# Patient Record
Sex: FEMALE | Race: WHITE | Employment: OTHER | URBAN - METROPOLITAN AREA
[De-identification: names, ages, dates, MRNs, and addresses within clinical notes are randomized per-mention and may not be internally consistent; named-entity substitution may affect disease eponyms.]

---

## 2017-05-02 ENCOUNTER — HOSPITAL ENCOUNTER (EMERGENCY)
Age: 82
Discharge: HOME | End: 2017-05-02
Attending: STUDENT IN AN ORGANIZED HEALTH CARE EDUCATION/TRAINING PROGRAM
Payer: MEDICARE

## 2017-05-02 VITALS
OXYGEN SATURATION: 96 % | SYSTOLIC BLOOD PRESSURE: 138 MMHG | HEART RATE: 79 BPM | DIASTOLIC BLOOD PRESSURE: 71 MMHG | RESPIRATION RATE: 16 BRPM | HEIGHT: 66 IN | TEMPERATURE: 98.3 F

## 2017-05-02 DIAGNOSIS — L98.9 SKIN LESION OF FOOT: Primary | ICD-10-CM

## 2017-05-02 PROCEDURE — 99282 EMERGENCY DEPT VISIT SF MDM: CPT

## 2017-05-02 RX ORDER — KETOCONAZOLE 20 MG/G
CREAM TOPICAL DAILY
Qty: 30 G | Refills: 1 | Status: SHIPPED | OUTPATIENT
Start: 2017-05-02

## 2017-05-02 NOTE — DISCHARGE INSTRUCTIONS
We hope that we have addressed all of your medical concerns. The examination and treatment you received in the Emergency Department were for an emergent problem and were not intended as complete care. It is important that you follow up with your healthcare provider(s) for ongoing care. If your symptoms worsen or do not improve as expected, and you are unable to reach your usual health care provider(s), you should return to the Emergency Department. Today's healthcare is undergoing tremendous change, and patient satisfaction surveys are one of the many tools to assess the quality of medical care. You may receive a survey from the [employer] regarding your experience in the Emergency Department. I hope that your experience has been completely positive, particularly the medical care that I provided. As such, please participate in the survey; anything less than excellent does not meet my expectations or intentions. [facility] and [address] [place] [address] participate in nationally recognized quality of care measures. If your blood pressure is greater than 120/80, as reported below, we urge that you seek medical care to address the potential of high blood pressure, commonly known as hypertension. Hypertension can be hereditary or can be caused by certain medical conditions, pain, stress, or \"white coat syndrome. \"       Please make an appointment with your health care provider(s) for follow up of your Emergency Department visit. VITALS:   Patient Vitals for the past 8 hrs:   Temp Pulse Resp BP SpO2   05/02/17 1240 98.3 °F (36.8 °C) 79 16 138/71 96 %          Thank you for allowing us to provide you with medical care today. We realize that you have many choices for your emergency care needs. Please choose us in the future for any continued health care needs. Lucy Whitten, [address] [place] [address] [address]   Office: [phone]            No results found for this or any previous visit (from the past 24 hour(s)). No results found.

## 2017-05-02 NOTE — ED PROVIDER NOTES
HPI Comments: 80 y.o. female with no significant past medical history who presents from home with chief complaint of left foot redness and pain. Pt reports that for the past week she has had a red area to the plantar surface of her foot with associated burning/ithing pain. She can not recall any recent insect bites or trauma to her foot. She also reports associated nausea that was at its worse this morning. She has tried Neosporin, Lotrimin cream, and nausea medication at home without relief. She denies fever, chills, vomiting, abdominal pain, muscle cramps, growing lesions. There are no other acute medical concerns at this time. PMH: MVR, CHF, HTN, Anxiety  PCP: Say Fabian MD  Note written by felicia Ramos, as dictated by Nury Fitzgerald MD 1:46 PM         The history is provided by the patient. Past Medical History:   Diagnosis Date    Anxiety     Bradycardia     Congestive heart failure (HCC)     Constipated     High blood pressure     Mitral valve regurgitation     Repaired 7/30/09    Psychiatric disorder     Anxiety       Past Surgical History:   Procedure Laterality Date    HX APPENDECTOMY      HX BACK SURGERY      HX BREAST LUMPECTOMY      HX CHOLECYSTECTOMY      HX HYSTERECTOMY      HX PARTIAL THYROIDECTOMY      left side    HX SHOULDER ARTHROSCOPY      Whiplash/muscle surgery    HX TUBAL LIGATION           History reviewed. No pertinent family history. Social History     Social History    Marital status:      Spouse name: N/A    Number of children: N/A    Years of education: N/A     Occupational History    Not on file. Social History Main Topics    Smoking status: Never Smoker    Smokeless tobacco: Not on file    Alcohol use No    Drug use: No    Sexual activity: Not on file     Other Topics Concern    Not on file     Social History Narrative         ALLERGIES: Coreg [carvedilol]; Phenylbutazone; Procainamide; Quinaglute dura-tabs;  Ace inhibitors; Actonel [risedronate]; Aspirin; Atacand [candesartan]; Avelox [moxifloxacin]; Bromplex dm [brompheniramine-pseudoeph-dm]; Calcium channel blocking agents-dihydropyridines; Celexa [citalopram]; Codeine; Corgard [nadolol]; Coumadin [warfarin]; Doxepin; Effexor [venlafaxine]; Hydralazine; Hydroxyzine; Lopressor [metoprolol tartrate]; Macrodantin [nitrofurantoin macrocrystalline]; Motrin [ibuprofen]; Pacerone [amiodarone]; Palgic [carbinoxamine maleate]; Paxil [paroxetine hcl]; Pcn [penicillins]; Phenergan [promethazine]; Prednisone; Propulsid; Serax [oxazepam]; Statins-hmg-coa reductase inhibitors; Sulfa (sulfonamide antibiotics); Tessalon [benzonatate]; Tiazac [diltiazem hcl]; Trazodone; Ultram [tramadol]; Verapamil; Aleda Dais; Zelnorm [tegaserod hydrogen maleate]; and Zetia [ezetimibe]    Review of Systems   Constitutional: Negative for chills and fever. HENT: Negative for congestion, rhinorrhea and sinus pressure. Respiratory: Negative for cough and shortness of breath. Cardiovascular: Negative for chest pain and palpitations. Gastrointestinal: Positive for nausea. Negative for abdominal pain, diarrhea and vomiting. Musculoskeletal: Positive for myalgias. Negative for back pain. Skin: Positive for color change and rash. All other systems reviewed and are negative. Vitals:    05/02/17 1240   BP: 138/71   Pulse: 79   Resp: 16   Temp: 98.3 °F (36.8 °C)   SpO2: 96%   Height: 5' 6\" (1.676 m)            Physical Exam   Constitutional: She is oriented to person, place, and time. She appears well-developed and well-nourished. HENT:   Head: Normocephalic and atraumatic. Eyes: Conjunctivae and EOM are normal. Pupils are equal, round, and reactive to light. Neck: Normal range of motion. Neck supple. Cardiovascular: Normal rate, regular rhythm and normal heart sounds. No murmur heard. Pulmonary/Chest: Effort normal and breath sounds normal. No respiratory distress. Abdominal: Soft. Bowel sounds are normal. She exhibits no distension. There is no tenderness. There is no rebound. Musculoskeletal: Normal range of motion. She exhibits no edema. Neurological: She is alert and oriented to person, place, and time. No cranial nerve deficit. She exhibits normal muscle tone. Coordination normal.   Skin: Skin is warm and dry. There is erythema. 2X3 cm area of erythema to plantar surface of left foot (see image)   Psychiatric: She has a normal mood and affect. Her behavior is normal.   Nursing note and vitals reviewed. Note written by felicia Bess, as dictated by No att. providers found 2:04 PM      MDM  ED Course   DDx: favor fungal infection given ringed, [place]-shape with prurutis. Less likely insect bite, infection, inflammation (no h/o gout).     Procedures

## 2017-05-02 NOTE — ED TRIAGE NOTES
Pt stated there is a red spot on the bottom of her left foot for a week, denies injury, denies fever , pt stated it itches , +red area noted to bottom of foot, no swelling noted

## 2017-05-05 NOTE — SENIOR SERVICES NOTE
Call placed to patient - introduced to role of CM. Patient has been applying ointment to her foot. No needs verbalized. Patient asked if notification will be sent to PCP - informed would receive documentation for ED visit.

## 2017-05-27 ENCOUNTER — APPOINTMENT (OUTPATIENT)
Dept: GENERAL RADIOLOGY | Age: 82
End: 2017-05-27
Attending: NURSE PRACTITIONER
Payer: MEDICARE

## 2017-05-27 ENCOUNTER — HOSPITAL ENCOUNTER (EMERGENCY)
Age: 82
Discharge: HOME | End: 2017-05-28
Attending: EMERGENCY MEDICINE
Payer: MEDICARE

## 2017-05-27 DIAGNOSIS — K57.90 DIVERTICULOSIS OF INTESTINE WITHOUT BLEEDING, UNSPECIFIED INTESTINAL TRACT LOCATION: ICD-10-CM

## 2017-05-27 DIAGNOSIS — R63.0 LACK OF APPETITE: ICD-10-CM

## 2017-05-27 DIAGNOSIS — N28.89 LEFT RENAL MASS: ICD-10-CM

## 2017-05-27 DIAGNOSIS — K59.00 CONSTIPATION, UNSPECIFIED CONSTIPATION TYPE: Primary | ICD-10-CM

## 2017-05-27 DIAGNOSIS — Z87.19 HISTORY OF IBS: ICD-10-CM

## 2017-05-27 DIAGNOSIS — Z86.59 HISTORY OF ANXIETY: ICD-10-CM

## 2017-05-27 LAB
BASOPHILS # BLD AUTO: 0 K/UL (ref 0–0.1)
BASOPHILS # BLD: 0 % (ref 0–1)
EOSINOPHIL # BLD: 0.2 K/UL (ref 0–0.4)
EOSINOPHIL NFR BLD: 2 % (ref 0–7)
ERYTHROCYTE [DISTWIDTH] IN BLOOD BY AUTOMATED COUNT: 13.6 % (ref 11.5–14.5)
HCT VFR BLD AUTO: 39.3 % (ref 35–47)
HGB BLD-MCNC: 12.6 G/DL (ref 11.5–16)
LYMPHOCYTES # BLD AUTO: 22 % (ref 12–49)
LYMPHOCYTES # BLD: 1.8 K/UL (ref 0.8–3.5)
MCH RBC QN AUTO: 29.3 PG (ref 26–34)
MCHC RBC AUTO-ENTMCNC: 32.1 G/DL (ref 30–36.5)
MCV RBC AUTO: 91.4 FL (ref 80–99)
MONOCYTES # BLD: 0.9 K/UL (ref 0–1)
MONOCYTES NFR BLD AUTO: 11 % (ref 5–13)
NEUTS SEG # BLD: 5.1 K/UL (ref 1.8–8)
NEUTS SEG NFR BLD AUTO: 65 % (ref 32–75)
PLATELET # BLD AUTO: 230 K/UL (ref 150–400)
RBC # BLD AUTO: 4.3 M/UL (ref 3.8–5.2)
WBC # BLD AUTO: 8 K/UL (ref 3.6–11)

## 2017-05-27 PROCEDURE — 74011250636 HC RX REV CODE- 250/636: Performed by: NURSE PRACTITIONER

## 2017-05-27 PROCEDURE — 74020 XR ABD FLAT/ ERECT: CPT

## 2017-05-27 PROCEDURE — 99284 EMERGENCY DEPT VISIT MOD MDM: CPT

## 2017-05-27 PROCEDURE — 80053 COMPREHEN METABOLIC PANEL: CPT | Performed by: NURSE PRACTITIONER

## 2017-05-27 PROCEDURE — 96360 HYDRATION IV INFUSION INIT: CPT

## 2017-05-27 PROCEDURE — 85025 COMPLETE CBC W/AUTO DIFF WBC: CPT | Performed by: NURSE PRACTITIONER

## 2017-05-27 PROCEDURE — 83690 ASSAY OF LIPASE: CPT | Performed by: NURSE PRACTITIONER

## 2017-05-27 PROCEDURE — 36415 COLL VENOUS BLD VENIPUNCTURE: CPT | Performed by: NURSE PRACTITIONER

## 2017-05-27 RX ADMIN — SODIUM CHLORIDE 500 ML: 900 INJECTION, SOLUTION INTRAVENOUS at 23:21

## 2017-05-28 ENCOUNTER — APPOINTMENT (OUTPATIENT)
Dept: CT IMAGING | Age: 82
End: 2017-05-28
Attending: NURSE PRACTITIONER
Payer: MEDICARE

## 2017-05-28 VITALS
DIASTOLIC BLOOD PRESSURE: 78 MMHG | TEMPERATURE: 97.9 F | HEART RATE: 80 BPM | OXYGEN SATURATION: 96 % | SYSTOLIC BLOOD PRESSURE: 147 MMHG | RESPIRATION RATE: 18 BRPM

## 2017-05-28 LAB
ALBUMIN SERPL BCP-MCNC: 3.9 G/DL (ref 3.5–5)
ALBUMIN/GLOB SERPL: 0.9 {RATIO} (ref 1.1–2.2)
ALP SERPL-CCNC: 70 U/L (ref 45–117)
ALT SERPL-CCNC: 23 U/L (ref 12–78)
ANION GAP BLD CALC-SCNC: 8 MMOL/L (ref 5–15)
AST SERPL W P-5'-P-CCNC: 18 U/L (ref 15–37)
BILIRUB SERPL-MCNC: 1.1 MG/DL (ref 0.2–1)
BUN SERPL-MCNC: 15 MG/DL (ref 6–20)
BUN/CREAT SERPL: 12 (ref 12–20)
CALCIUM SERPL-MCNC: 9.2 MG/DL (ref 8.5–10.1)
CHLORIDE SERPL-SCNC: 95 MMOL/L (ref 97–108)
CO2 SERPL-SCNC: 31 MMOL/L (ref 21–32)
CREAT SERPL-MCNC: 1.25 MG/DL (ref 0.55–1.02)
GLOBULIN SER CALC-MCNC: 4.4 G/DL (ref 2–4)
GLUCOSE SERPL-MCNC: 114 MG/DL (ref 65–100)
HEMOCCULT STL QL: NEGATIVE
LIPASE SERPL-CCNC: 174 U/L (ref 73–393)
POTASSIUM SERPL-SCNC: 3.8 MMOL/L (ref 3.5–5.1)
PROT SERPL-MCNC: 8.3 G/DL (ref 6.4–8.2)
SODIUM SERPL-SCNC: 134 MMOL/L (ref 136–145)

## 2017-05-28 PROCEDURE — 82272 OCCULT BLD FECES 1-3 TESTS: CPT | Performed by: NURSE PRACTITIONER

## 2017-05-28 PROCEDURE — 74011636320 HC RX REV CODE- 636/320: Performed by: EMERGENCY MEDICINE

## 2017-05-28 PROCEDURE — 74011250637 HC RX REV CODE- 250/637: Performed by: NURSE PRACTITIONER

## 2017-05-28 PROCEDURE — 74176 CT ABD & PELVIS W/O CONTRAST: CPT

## 2017-05-28 RX ORDER — DICYCLOMINE HYDROCHLORIDE 10 MG/1
10 CAPSULE ORAL 4 TIMES DAILY
Qty: 20 CAP | Refills: 0 | Status: SHIPPED | OUTPATIENT
Start: 2017-05-28 | End: 2017-06-02

## 2017-05-28 RX ORDER — AMOXICILLIN 250 MG
2 CAPSULE ORAL DAILY
Qty: 10 TAB | Refills: 0 | Status: SHIPPED | OUTPATIENT
Start: 2017-05-28 | End: 2017-06-02

## 2017-05-28 RX ORDER — DICYCLOMINE HYDROCHLORIDE 10 MG/ML
INJECTION INTRAMUSCULAR
Status: DISCONTINUED
Start: 2017-05-28 | End: 2017-05-28 | Stop reason: HOSPADM

## 2017-05-28 RX ORDER — DICYCLOMINE HYDROCHLORIDE 10 MG/1
CAPSULE ORAL
Status: DISCONTINUED
Start: 2017-05-28 | End: 2017-05-28 | Stop reason: HOSPADM

## 2017-05-28 RX ORDER — AMOXICILLIN 250 MG
3 CAPSULE ORAL
Status: COMPLETED | OUTPATIENT
Start: 2017-05-28 | End: 2017-05-28

## 2017-05-28 RX ORDER — PEPPERMINT OIL
SPIRIT ORAL ONCE
Status: COMPLETED | OUTPATIENT
Start: 2017-05-28 | End: 2017-05-28

## 2017-05-28 RX ORDER — DICYCLOMINE HYDROCHLORIDE 10 MG/1
10 CAPSULE ORAL
Status: COMPLETED | OUTPATIENT
Start: 2017-05-28 | End: 2017-05-28

## 2017-05-28 RX ADMIN — IOHEXOL 50 ML: 240 INJECTION, SOLUTION INTRATHECAL; INTRAVASCULAR; INTRAVENOUS; ORAL at 02:31

## 2017-05-28 RX ADMIN — DICYCLOMINE HYDROCHLORIDE 10 MG: 10 CAPSULE ORAL at 01:22

## 2017-05-28 RX ADMIN — Medication: at 01:27

## 2017-05-28 RX ADMIN — DOCUSATE SODIUM AND SENNOSIDES 3 TABLET: 8.6; 5 TABLET, FILM COATED ORAL at 01:25

## 2017-05-28 NOTE — ED NOTES
Patient received discharge instructions by PA and nurse. Patient verbalized understanding of medication use and other discharge instructions. Updated vitals, IV DC and patient wheeled out of ED accompanied by family and nurse, showing no signs of distress. Pt reports relief of most intense pain. All questions answered.

## 2017-05-28 NOTE — ED NOTES
Pt resting comfortably on stretcher. Denies needs at this time. Call light within reach, patient instructed on use.     Patient drinking CT contrast.

## 2017-05-28 NOTE — DISCHARGE INSTRUCTIONS
We hope that we have addressed all of your medical concerns. The examination and treatment you received in the Emergency Department were for an emergent problem and were not intended as complete care. It is important that you follow up with your healthcare provider(s) for ongoing care. If your symptoms worsen or do not improve as expected, and you are unable to reach your usual health care provider(s), you should return to the Emergency Department. Today's healthcare is undergoing tremendous change, and patient satisfaction surveys are one of the many tools to assess the quality of medical care. You may receive a survey from the [employer] regarding your experience in the Emergency Department. I hope that your experience has been completely positive, particularly the medical care that I provided. As such, please participate in the survey; anything less than excellent does not meet my expectations or intentions. [facility] and [address] [place] [address] participate in nationally recognized quality of care measures. If your blood pressure is greater than 120/80, as reported below, we urge that you seek medical care to address the potential of high blood pressure, commonly known as hypertension. Hypertension can be hereditary or can be caused by certain medical conditions, pain, stress, or \"white coat syndrome. \"       Please make an appointment with your health care provider(s) for follow up of your Emergency Department visit. VITALS:   Patient Vitals for the past 8 hrs:   Temp Pulse Resp BP SpO2   05/27/17 2300 97.9 °F (36.6 °C) 86 16 145/81 96 %          Thank you for allowing us to provide you with medical care today. We realize that you have many choices for your emergency care needs. Please choose us in the future for any continued health care needs. Cara Landry  [address] [facility],[address].   Office: [phone]            Recent Results (from the past 24 hour(s))   CBC WITH AUTOMATED DIFF    Collection Time: 05/27/17 11:20 PM   Result Value Ref Range    WBC 8.0 3.6 - 11.0 K/uL    RBC 4.30 3.80 - 5.20 M/uL    HGB 12.6 11.5 - 16.0 g/dL    HCT 39.3 35.0 - 47.0 %    MCV 91.4 80.0 - 99.0 FL    MCH 29.3 26.0 - 34.0 PG    MCHC 32.1 30.0 - 36.5 g/dL    RDW 13.6 11.5 - 14.5 %    PLATELET 864 108 - 152 K/uL    NEUTROPHILS 65 32 - 75 %    LYMPHOCYTES 22 12 - 49 %    MONOCYTES 11 5 - 13 %    EOSINOPHILS 2 0 - 7 %    BASOPHILS 0 0 - 1 %    ABS. NEUTROPHILS 5.1 1.8 - 8.0 K/UL    ABS. LYMPHOCYTES 1.8 0.8 - 3.5 K/UL    ABS. MONOCYTES 0.9 0.0 - 1.0 K/UL    ABS. EOSINOPHILS 0.2 0.0 - 0.4 K/UL    ABS. BASOPHILS 0.0 0.0 - 0.1 K/UL   METABOLIC PANEL, COMPREHENSIVE    Collection Time: 05/27/17 11:20 PM   Result Value Ref Range    Sodium 134 (L) 136 - 145 mmol/L    Potassium 3.8 3.5 - 5.1 mmol/L    Chloride 95 (L) 97 - 108 mmol/L    CO2 31 21 - 32 mmol/L    Anion gap 8 5 - 15 mmol/L    Glucose 114 (H) 65 - 100 mg/dL    BUN 15 6 - 20 MG/DL    Creatinine 1.25 (H) 0.55 - 1.02 MG/DL    BUN/Creatinine ratio 12 12 - 20      GFR est AA 49 (L) >60 ml/min/1.73m2    GFR est non-AA 40 (L) >60 ml/min/1.73m2    Calcium 9.2 8.5 - 10.1 MG/DL    Bilirubin, total 1.1 (H) 0.2 - 1.0 MG/DL    ALT (SGPT) 23 12 - 78 U/L    AST (SGOT) 18 15 - 37 U/L    Alk. phosphatase 70 45 - 117 U/L    Protein, total 8.3 (H) 6.4 - 8.2 g/dL    Albumin 3.9 3.5 - 5.0 g/dL    Globulin 4.4 (H) 2.0 - 4.0 g/dL    A-G Ratio 0.9 (L) 1.1 - 2.2         Xr Abd Flat/ Erect    Result Date: 5/27/2017  EXAMINATION: Abdomen 2 views. INDICATION: abdominal pain x3 d w/o BM Supine and upright views of the abdomen show the bowel gas pattern is within normal limits. There is mild stool. There is no pneumoperitoneum. There are vascular calcifications and chronic hepatomegaly. IMPRESSION:  No acute findings.                 Constipation: Care Instructions  Your Care Instructions  Constipation means that you have a hard time passing stools (bowel movements). People pass stools from 3 times a day to once every 3 days. What is normal for you may be different. Constipation may occur with pain in the rectum and cramping. The pain may get worse when you try to pass stools. Sometimes there are small amounts of bright red blood on toilet paper or the surface of stools. This is because of enlarged veins near the rectum (hemorrhoids). A few changes in your diet and lifestyle may help you avoid ongoing constipation. Your doctor may also prescribe medicine to help loosen your stool. Some medicines can cause constipation. These include pain medicines and antidepressants. Tell your doctor about all the medicines you take. Your doctor may want to make a medicine change to ease your symptoms. Follow-up care is a key part of your treatment and safety. Be sure to make and go to all appointments, and call your doctor if you are having problems. It's also a good idea to know your test results and keep a list of the medicines you take. How can you care for yourself at home? · Drink plenty of fluids, enough so that your urine is light yellow or clear like water. If you have kidney, heart, or liver disease and have to limit fluids, talk with your doctor before you increase the amount of fluids you drink. · Include high-fiber foods in your diet each day. These include fruits, vegetables, beans, and whole grains. · Get at least 30 minutes of exercise on most days of the week. Walking is a good choice. You also may want to do other activities, such as running, swimming, cycling, or playing tennis or team sports. · Take a fiber supplement, such as Citrucel or Metamucil, every day. Read and follow all instructions on the label. · Schedule time each day for a bowel movement. A daily routine may help. Take your time having your bowel movement.   · Support your feet with a small step stool when you sit on the toilet. This helps flex your hips and places your pelvis in a squatting position. · Your doctor may recommend an over-the-counter laxative to relieve your constipation. Examples are Milk of Magnesia and MiraLax. Read and follow all instructions on the label. Do not use laxatives on a long-term basis. When should you call for help? Call your doctor now or seek immediate medical care if:  · You have new or worse belly pain. · You have new or worse nausea or vomiting. · You have blood in your stools. Watch closely for changes in your health, and be sure to contact your doctor if:  · Your constipation is getting worse. · You do not get better as expected. Where can you learn more? Go to http://kathi-samira.info/. Enter 21 [phone] in the search box to learn more about \"Constipation: Care Instructions. \"  Current as of: May 27, 2016  Content Version: 11.2  © 9365-7899 [employer]. Care instructions adapted under license by [employer] (which disclaims liability or warranty for this information). If you have questions about a medical condition or this instruction, always ask your healthcare professional. [place] [address] any warranty or liability for your use of this information.

## 2017-05-28 NOTE — ED PROVIDER NOTES
HPI Comments: Fletcher Dinero is a 80 y.o. female with Hx of IBS, anxiety, CHF constipation who presents ambulatory with family to [facility] ED with cc of lower abdominal pain and constipation. Pt reports no BM x2-3days. Pt describes the pain as a cramping sensation and feeling as if she needs to have a BM. She reports taking Milk of Magnesia and drinking a laxative tea which normally relieves her constipation. She states that she has drank the tea more often than normal and has not been able to go to the BR. She reports that she feels the urge to have a BM but has been unable to. She states that she has lost her appetite and is nauseated today. She denies any episodes of vomiting. She reports that when she strains to have a BM she occasionally wipes blood on the toilet paper from her rectum. She denies any black tarry stools or BRB stools. No fevers, chills, urinary symptoms. Pt reports having history of a \"twisted colon\" in 1977 and is concerned about her symptoms. She is not under the care of a GI MD. Pt preferred her GI MD who is no longer in practice and has not care for other GI MD that she has seen. Family states pt has not seen a GI MD in \"years. \"  She reports that she has fluctuations in her stool patterns often. PCP: Blayne Viera MD    There are no other complaints, changes or physical findings at this time. The history is provided by the patient and a relative.         Past Medical History:   Diagnosis Date    Anxiety     Bradycardia     Congestive heart failure (HCC)     Constipated     High blood pressure     Mitral valve regurgitation     Repaired 7/30/09    Psychiatric disorder     Anxiety       Past Surgical History:   Procedure Laterality Date    HX APPENDECTOMY      HX BACK SURGERY      HX BREAST LUMPECTOMY      HX CHOLECYSTECTOMY      HX HYSTERECTOMY      HX PARTIAL THYROIDECTOMY      left side    HX SHOULDER ARTHROSCOPY      Whiplash/muscle surgery    HX TUBAL LIGATION History reviewed. No pertinent family history. Social History     Social History    Marital status:      Spouse name: N/A    Number of children: N/A    Years of education: N/A     Occupational History    Not on file. Social History Main Topics    Smoking status: Never Smoker    Smokeless tobacco: Not on file    Alcohol use No    Drug use: No    Sexual activity: Not on file     Other Topics Concern    Not on file     Social History Narrative         ALLERGIES: Coreg [carvedilol]; Phenylbutazone; Procainamide; Quinaglute dura-tabs; Ace inhibitors; Actonel [risedronate]; Aspirin; Atacand [candesartan]; Avelox [moxifloxacin]; Bromplex dm [brompheniramine-pseudoeph-dm]; Calcium channel blocking agents-dihydropyridines; Celexa [citalopram]; Codeine; Corgard [nadolol]; Coumadin [warfarin]; Doxepin; Effexor [venlafaxine]; Hydralazine; Hydroxyzine; Lopressor [metoprolol tartrate]; Macrodantin [nitrofurantoin macrocrystalline]; Motrin [ibuprofen]; Pacerone [amiodarone]; Palgic [carbinoxamine maleate]; Paxil [paroxetine hcl]; Pcn [penicillins]; Phenergan [promethazine]; Prednisone; Propulsid; Serax [oxazepam]; Statins-hmg-coa reductase inhibitors; Sulfa (sulfonamide antibiotics); Tessalon [benzonatate]; Tiazac [diltiazem hcl]; Trazodone; Ultram [tramadol]; Verapamil; Iraida Odor; Zelnorm [tegaserod hydrogen maleate]; and Zetia [ezetimibe]    Review of Systems   Constitutional: Negative for activity change, appetite change, chills and fever. HENT: Negative for congestion, rhinorrhea, sinus pressure, sneezing and sore throat. Eyes: Negative for pain, discharge and visual disturbance. Respiratory: Negative for cough and shortness of breath. Cardiovascular: Negative for chest pain. Gastrointestinal: Positive for abdominal pain, constipation and nausea. Negative for diarrhea and vomiting. Genitourinary: Negative for dysuria, flank pain, frequency and urgency. Musculoskeletal: Negative for arthralgias, back pain, gait problem, joint swelling, myalgias and neck pain. Skin: Negative for color change and rash. Neurological: Negative for dizziness, speech difficulty, weakness, light-headedness, numbness and headaches. Psychiatric/Behavioral: Negative for agitation, behavioral problems and confusion. All other systems reviewed and are negative. Vitals:    05/27/17 2300 05/28/17 0200   BP: 145/81 140/78   Pulse: 86 86   Resp: 16 16   Temp: 97.9 °F (36.6 °C) 98 °F (36.7 °C)   SpO2: 96% 97%            Physical Exam   Constitutional: She is oriented to person, place, and time. She appears well-developed and well-nourished. No distress. HENT:   Head: Normocephalic and atraumatic. Right Ear: External ear normal.   Left Ear: External ear normal.   Nose: Nose normal.   Mouth/Throat: Oropharynx is clear and moist. No oropharyngeal exudate. Eyes: Conjunctivae and EOM are normal. Pupils are equal, round, and reactive to light. Neck: Normal range of motion. Neck supple. Cardiovascular: Normal rate, regular rhythm and intact distal pulses. Murmur heard. Pulmonary/Chest: Effort normal and breath sounds normal.   Abdominal: Soft. Bowel sounds are normal. There is no tenderness. There is no rebound and no guarding. Musculoskeletal: Normal range of motion. Neurological: She is alert and oriented to person, place, and time. Skin: Skin is warm and dry. Psychiatric: She has a normal mood and affect. Her behavior is normal. Judgment and thought content normal.   Nursing note and vitals reviewed.        MDM  Number of Diagnoses or Management Options  Constipation, unspecified constipation type:   Diverticulosis of intestine without bleeding, unspecified intestinal tract location:   History of anxiety:   History of IBS:   Lack of appetite:   Left renal mass:   Diagnosis management comments: DDx: constipation, SBO, IBS, Diverticulosis/ diverticulitis     81 yo F presents with family 2/2 lower abdominal pain and no BM x 2days. States nausea, no vomitting and lack of appetite. No emergent lab findings. CBC WNL. KUB with non-obstructive bowel pattern. (+) BM post enema with (-) hemoccult. CT with diverticulosis, liver congestion with known rCHF, L renal mass. Advised GI f/u. Given instruction on bowel regimen for next few days. F/u with PCP. Pt tolerated PO w/o difficulty in ED. Family states understanding. Amount and/or Complexity of Data Reviewed  Clinical lab tests: ordered and reviewed  Tests in the radiology section of CPT®: ordered and reviewed  Review and summarize past medical records: yes  Discuss the patient with other providers: yes      ED Course       Procedures    LABORATORY TESTS:  Recent Results (from the past 12 hour(s))   CBC WITH AUTOMATED DIFF    Collection Time: 05/27/17 11:20 PM   Result Value Ref Range    WBC 8.0 3.6 - 11.0 K/uL    RBC 4.30 3.80 - 5.20 M/uL    HGB 12.6 11.5 - 16.0 g/dL    HCT 39.3 35.0 - 47.0 %    MCV 91.4 80.0 - 99.0 FL    MCH 29.3 26.0 - 34.0 PG    MCHC 32.1 30.0 - 36.5 g/dL    RDW 13.6 11.5 - 14.5 %    PLATELET 556 658 - 562 K/uL    NEUTROPHILS 65 32 - 75 %    LYMPHOCYTES 22 12 - 49 %    MONOCYTES 11 5 - 13 %    EOSINOPHILS 2 0 - 7 %    BASOPHILS 0 0 - 1 %    ABS. NEUTROPHILS 5.1 1.8 - 8.0 K/UL    ABS. LYMPHOCYTES 1.8 0.8 - 3.5 K/UL    ABS. MONOCYTES 0.9 0.0 - 1.0 K/UL    ABS. EOSINOPHILS 0.2 0.0 - 0.4 K/UL    ABS.  BASOPHILS 0.0 0.0 - 0.1 K/UL   METABOLIC PANEL, COMPREHENSIVE    Collection Time: 05/27/17 11:20 PM   Result Value Ref Range    Sodium 134 (L) 136 - 145 mmol/L    Potassium 3.8 3.5 - 5.1 mmol/L    Chloride 95 (L) 97 - 108 mmol/L    CO2 31 21 - 32 mmol/L    Anion gap 8 5 - 15 mmol/L    Glucose 114 (H) 65 - 100 mg/dL    BUN 15 6 - 20 MG/DL    Creatinine 1.25 (H) 0.55 - 1.02 MG/DL    BUN/Creatinine ratio 12 12 - 20      GFR est AA 49 (L) >60 ml/min/1.73m2    GFR est non-AA 40 (L) >60 ml/min/1.73m2    Calcium 9.2 8.5 - 10.1 MG/DL    Bilirubin, total 1.1 (H) 0.2 - 1.0 MG/DL    ALT (SGPT) 23 12 - 78 U/L    AST (SGOT) 18 15 - 37 U/L    Alk. phosphatase 70 45 - 117 U/L    Protein, total 8.3 (H) 6.4 - 8.2 g/dL    Albumin 3.9 3.5 - 5.0 g/dL    Globulin 4.4 (H) 2.0 - 4.0 g/dL    A-G Ratio 0.9 (L) 1.1 - 2.2     LIPASE    Collection Time: 05/27/17 11:20 PM   Result Value Ref Range    Lipase 174 73 - 393 U/L   OCCULT BLOOD, STOOL    Collection Time: 05/28/17  1:27 AM   Result Value Ref Range    Occult blood, stool NEGATIVE  NEG         IMAGING RESULTS:  XR ABD FLAT/ ERECT   Final Result   EXAMINATION: Abdomen 2 views.     INDICATION: abdominal pain x3 d w/o BM      Supine and upright views of the abdomen show the bowel gas pattern is within  normal limits. There is mild stool. There is no pneumoperitoneum. There are  vascular calcifications and chronic hepatomegaly.     IMPRESSION  IMPRESSION: No acute findings. CT ABD PELV W CONT    (Results Pending)     INDICATION: Abdominal pain      EXAM: CT Abdomen and CT Pelvis without contrast.  CT dose reduction was achieved through use of a standardized protocol tailored  for this examination and automatic exposure control for dose modulation.     FINDINGS:      Bowels are not distended and there is no significant stool. There are colonic  diverticula.     There is a small to moderate quantity of ascites. There is hepatomegaly,  possibly passive congestion from right heart failure since there is prominent  IVC distention.     No urinary tract stones are seen. There is no hydroureteronephrosis. There is  2.5 cm left renal mass unchanged since 2015.     Pancreas, adrenal glands, spleen and aorta show no significant enlargement. Aorta is calcified. No focal inflammation is seen. There is no free air or  substantial adenopathy.     The bladder is empty. There is no apparent pelvic mass.     IMPRESSION  IMPRESSION:  1. Hepatomegaly, possibly due to passive congestion from right heart failure.   2. Ascites. 3. Colonic diverticulosis. 4. Stable left renal mass.             MEDICATIONS GIVEN:  Medications   dicyclomine (BENTYL) 10 mg/mL injection (  Canceled Entry 5/28/17 0137)   sodium chloride 0.9 % bolus infusion 500 mL (0 mL IntraVENous IV Completed 5/28/17 0021)   peppermint spirit solution ( Other Given 5/28/17 0127)   senna-docusate (PERICOLACE) 8.6-50 mg per tablet 3 Tab (3 Tabs Oral Given 5/28/17 0125)   dicyclomine (BENTYL) capsule 10 mg (10 mg Oral Given 5/28/17 0122)   iohexol (OMNIPAQUE) solution 50 mL (50 mL Oral Given 5/28/17 0231)       IMPRESSION:  1. Constipation, unspecified constipation type    2. History of IBS    3. Lack of appetite    4. History of anxiety    5. Diverticulosis of intestine without bleeding, unspecified intestinal tract location    6. Left renal mass        PLAN:  1. Current Discharge Medication List      START taking these medications    Details   senna-docusate (SENNA WITH DOCUSATE SODIUM) 8.6-50 mg per tablet Take 2 Tabs by mouth daily for 5 days. Qty: 10 Tab, Refills: 0      dicyclomine (BENTYL) 10 mg capsule Take 1 Cap by mouth four (4) times daily for 5 days. Qty: 20 Cap, Refills: 0         CONTINUE these medications which have NOT CHANGED    Details   ketoconazole (NIZORAL) 2 % topical cream Apply  to affected area daily. For 4 weeks. Qty: 30 g, Refills: 1      magnesium hydroxide (MILK OF MAGNESIA) 400 mg/5 mL suspension Take 30 mL by mouth nightly. Indications: BOWEL EVACUATION, GASTROESOPHAGEAL REFLUX, HEARTBURN      apixaban (ELIQUIS) 2.5 mg tablet Take 2.5 mg by mouth two (2) times a day. Calcium Citrate-Vitamin D3 500 mg calcium -400 unit chew Take 1 tablet by mouth daily. acebutolol (SECTRAL) 200 mg capsule Take 200 mg by mouth three (3) times daily. aspirin delayed-release 81 mg tablet Take 81 mg by mouth daily. valsartan (DIOVAN) 160 mg tablet Take 160 mg by mouth daily.       doxazosin (CARDURA) 4 mg tablet Take 4 mg by mouth daily.      furosemide (LASIX) 40 mg tablet Take 40 mg by mouth two (2) times a day. linaclotide (LINZESS) 145 mcg cap capsule Take 145 mcg by mouth daily as needed. lorazepam (ATIVAN) 0.5 mg tablet Take 0.5 mg by mouth every twelve (12) hours as needed for Anxiety. esomeprazole (NEXIUM) 40 mg capsule Take 40 mg by mouth daily. potassium chloride SR (KLOR-CON 10) 10 mEq tablet Take 20 mEq by mouth daily. levothyroxine (SYNTHROID) 100 mcg tablet Take 100 mcg by mouth Daily (before breakfast). cetirizine (ZYRTEC) 10 mg tablet Take 10 mg by mouth daily. LACTASE ENZYME PO Take 2 Tabs by mouth as needed. magnesium oxide (MAG-OX) 400 mg tablet Take 400 mg by mouth daily. Cholecalciferol, Vitamin D3, (VITAMIN D3) 1,000 unit cap Take 1 tablet by mouth.      b complex vitamins tablet Take 1 tablet by mouth daily. iron 18 mg tab Take 18 mg by mouth daily. Brand name = Gentlesorb           2. Follow-up Information     Follow up With Details Comments Contact Info    Tello Schaffer MD Schedule an appointment as soon as possible for a visit  [place] [address] [address]      [facility] [address], [facility] DEP Go to As needed, If symptoms worsen [address] [facility]  [phone]    [facility] Schedule an appointment as soon as possible for a visit  [address] [facility] [address] [facility] [address]          3. Return to ED if worse        Discharge Note:    The patient is ready for discharge. The patient's signs, symptoms, diagnosis, and discharge instruction have been discussed and the patient has conveyed their understanding. The patient is to follow up as recommended or return to the ER should their symptoms worsen. Plan has been discussed and the patient is in agreement.     Micaela Aaron NP

## 2017-05-31 ENCOUNTER — HOSPITAL ENCOUNTER (EMERGENCY)
Age: 82
Discharge: HOME | End: 2017-06-01
Attending: EMERGENCY MEDICINE
Payer: MEDICARE

## 2017-05-31 DIAGNOSIS — R10.84 ABDOMINAL PAIN, GENERALIZED: Primary | ICD-10-CM

## 2017-05-31 DIAGNOSIS — K59.00 CONSTIPATION, UNSPECIFIED CONSTIPATION TYPE: ICD-10-CM

## 2017-05-31 PROCEDURE — 99284 EMERGENCY DEPT VISIT MOD MDM: CPT

## 2017-06-01 ENCOUNTER — APPOINTMENT (OUTPATIENT)
Dept: CT IMAGING | Age: 82
End: 2017-06-01
Attending: PHYSICIAN ASSISTANT
Payer: MEDICARE

## 2017-06-01 ENCOUNTER — APPOINTMENT (OUTPATIENT)
Dept: GENERAL RADIOLOGY | Age: 82
End: 2017-06-01
Attending: PHYSICIAN ASSISTANT
Payer: MEDICARE

## 2017-06-01 VITALS
SYSTOLIC BLOOD PRESSURE: 144 MMHG | OXYGEN SATURATION: 99 % | DIASTOLIC BLOOD PRESSURE: 78 MMHG | HEART RATE: 76 BPM | TEMPERATURE: 98.6 F | RESPIRATION RATE: 18 BRPM

## 2017-06-01 LAB
ALBUMIN SERPL BCP-MCNC: 4 G/DL (ref 3.5–5)
ALBUMIN/GLOB SERPL: 1 {RATIO} (ref 1.1–2.2)
ALP SERPL-CCNC: 68 U/L (ref 45–117)
ALT SERPL-CCNC: 21 U/L (ref 12–78)
ANION GAP BLD CALC-SCNC: 10 MMOL/L (ref 5–15)
AST SERPL W P-5'-P-CCNC: 20 U/L (ref 15–37)
BASOPHILS # BLD AUTO: 0 K/UL (ref 0–0.1)
BASOPHILS # BLD: 0 % (ref 0–1)
BILIRUB SERPL-MCNC: 1.1 MG/DL (ref 0.2–1)
BUN SERPL-MCNC: 11 MG/DL (ref 6–20)
BUN/CREAT SERPL: 9 (ref 12–20)
CALCIUM SERPL-MCNC: 9.4 MG/DL (ref 8.5–10.1)
CHLORIDE SERPL-SCNC: 95 MMOL/L (ref 97–108)
CO2 SERPL-SCNC: 28 MMOL/L (ref 21–32)
CREAT SERPL-MCNC: 1.28 MG/DL (ref 0.55–1.02)
EOSINOPHIL # BLD: 0.1 K/UL (ref 0–0.4)
EOSINOPHIL NFR BLD: 1 % (ref 0–7)
ERYTHROCYTE [DISTWIDTH] IN BLOOD BY AUTOMATED COUNT: 13.3 % (ref 11.5–14.5)
GLOBULIN SER CALC-MCNC: 4.2 G/DL (ref 2–4)
GLUCOSE SERPL-MCNC: 114 MG/DL (ref 65–100)
HCT VFR BLD AUTO: 37.7 % (ref 35–47)
HGB BLD-MCNC: 12.5 G/DL (ref 11.5–16)
LYMPHOCYTES # BLD AUTO: 24 % (ref 12–49)
LYMPHOCYTES # BLD: 1.8 K/UL (ref 0.8–3.5)
MCH RBC QN AUTO: 29.6 PG (ref 26–34)
MCHC RBC AUTO-ENTMCNC: 33.2 G/DL (ref 30–36.5)
MCV RBC AUTO: 89.3 FL (ref 80–99)
MONOCYTES # BLD: 0.9 K/UL (ref 0–1)
MONOCYTES NFR BLD AUTO: 12 % (ref 5–13)
NEUTS SEG # BLD: 4.7 K/UL (ref 1.8–8)
NEUTS SEG NFR BLD AUTO: 63 % (ref 32–75)
PLATELET # BLD AUTO: 235 K/UL (ref 150–400)
POTASSIUM SERPL-SCNC: 3.2 MMOL/L (ref 3.5–5.1)
PROT SERPL-MCNC: 8.2 G/DL (ref 6.4–8.2)
RBC # BLD AUTO: 4.22 M/UL (ref 3.8–5.2)
SODIUM SERPL-SCNC: 133 MMOL/L (ref 136–145)
WBC # BLD AUTO: 7.5 K/UL (ref 3.6–11)

## 2017-06-01 PROCEDURE — 80053 COMPREHEN METABOLIC PANEL: CPT | Performed by: EMERGENCY MEDICINE

## 2017-06-01 PROCEDURE — 74176 CT ABD & PELVIS W/O CONTRAST: CPT

## 2017-06-01 PROCEDURE — 36415 COLL VENOUS BLD VENIPUNCTURE: CPT | Performed by: EMERGENCY MEDICINE

## 2017-06-01 PROCEDURE — 74000 XR ABD (KUB): CPT

## 2017-06-01 PROCEDURE — 85025 COMPLETE CBC W/AUTO DIFF WBC: CPT | Performed by: EMERGENCY MEDICINE

## 2017-06-01 NOTE — ED NOTES
MD reviewed discharge instructions and options with patient and patient verbalized understanding. RN reviewed discharge instructions using teachback method. Pt ambulated to exit without difficulty and in no signs of acute distress escorted by daughter, and she  will drive home. No complaints or needs expressed at this time. Patient was counseled on medications prescribed at discharge. Patient to call PCP in the morning for appointment.

## 2017-06-01 NOTE — DISCHARGE INSTRUCTIONS
We hope that we have addressed all of your medical concerns. The examination and treatment you received in the Emergency Department were for an emergent problem and were not intended as complete care. It is important that you follow up with your healthcare provider(s) for ongoing care. If your symptoms worsen or do not improve as expected, and you are unable to reach your usual health care provider(s), you should return to the Emergency Department. Today's healthcare is undergoing tremendous change, and patient satisfaction surveys are one of the many tools to assess the quality of medical care. You may receive a survey from the [employer] organization regarding your experience in the Emergency Department. I hope that your experience has been completely positive, particularly the medical care that I provided. As such, please participate in the survey; anything less than excellent does not meet my expectations or intentions. [facility] and 8 [facility] participate in nationally recognized quality of care measures. If your blood pressure is greater than 120/80, as reported below, we urge that you seek medical care to address the potential of high blood pressure, commonly known as hypertension. Hypertension can be hereditary or can be caused by certain medical conditions, pain, stress, or \"white coat syndrome. \"       Please make an appointment with your health care provider(s) for follow up of your Emergency Department visit. VITALS:   Patient Vitals for the past 8 hrs:   Temp Pulse Resp BP SpO2   06/01/17 0224 98.6 °F (37 °C) 76 18 144/78 99 %   06/01/17 0045 - 78 18 149/79 97 %   06/01/17 0005 98.7 °F (37.1 °C) 84 18 (!) 143/94 99 %          Thank you for allowing us to provide you with medical care today. We realize that you have many choices for your emergency care needs. Please choose us in the future for any continued health care needs. Regards,           April C. [facility], [address] [place] [address].   Office: [phone]            Recent Results (from the past 24 hour(s))   CBC WITH AUTOMATED DIFF    Collection Time: 06/01/17  1:15 AM   Result Value Ref Range    WBC 7.5 3.6 - 11.0 K/uL    RBC 4.22 3.80 - 5.20 M/uL    HGB 12.5 11.5 - 16.0 g/dL    HCT 37.7 35.0 - 47.0 %    MCV 89.3 80.0 - 99.0 FL    MCH 29.6 26.0 - 34.0 PG    MCHC 33.2 30.0 - 36.5 g/dL    RDW 13.3 11.5 - 14.5 %    PLATELET 811 835 - 306 K/uL    NEUTROPHILS 63 32 - 75 %    LYMPHOCYTES 24 12 - 49 %    MONOCYTES 12 5 - 13 %    EOSINOPHILS 1 0 - 7 %    BASOPHILS 0 0 - 1 %    ABS. NEUTROPHILS 4.7 1.8 - 8.0 K/UL    ABS. LYMPHOCYTES 1.8 0.8 - 3.5 K/UL    ABS. MONOCYTES 0.9 0.0 - 1.0 K/UL    ABS. EOSINOPHILS 0.1 0.0 - 0.4 K/UL    ABS. BASOPHILS 0.0 0.0 - 0.1 K/UL   METABOLIC PANEL, COMPREHENSIVE    Collection Time: 06/01/17  1:15 AM   Result Value Ref Range    Sodium 133 (L) 136 - 145 mmol/L    Potassium 3.2 (L) 3.5 - 5.1 mmol/L    Chloride 95 (L) 97 - 108 mmol/L    CO2 28 21 - 32 mmol/L    Anion gap 10 5 - 15 mmol/L    Glucose 114 (H) 65 - 100 mg/dL    BUN 11 6 - 20 MG/DL    Creatinine 1.28 (H) 0.55 - 1.02 MG/DL    BUN/Creatinine ratio 9 (L) 12 - 20      GFR est AA 48 (L) >60 ml/min/1.73m2    GFR est non-AA 39 (L) >60 ml/min/1.73m2    Calcium 9.4 8.5 - 10.1 MG/DL    Bilirubin, total 1.1 (H) 0.2 - 1.0 MG/DL    ALT (SGPT) 21 12 - 78 U/L    AST (SGOT) 20 15 - 37 U/L    Alk. phosphatase 68 45 - 117 U/L    Protein, total 8.2 6.4 - 8.2 g/dL    Albumin 4.0 3.5 - 5.0 g/dL    Globulin 4.2 (H) 2.0 - 4.0 g/dL    A-G Ratio 1.0 (L) 1.1 - 2.2         Xr Abd (kub)    Result Date: 6/1/2017  EXAM:  XR ABD (KUB) INDICATION:  Abdominal Pain, ? constipation COMPARISON: 5/27/2017. FINDINGS: A supine radiograph of the abdomen shows a nonobstructive bowel gas pattern. A mild amount of stool and gas is seen throughout the colon. . No soft tissue masses or pathologic calcifications are identified. The bones are osteopenic. There is mild leftward curvature of the lumbar spine. . The patient is status post cholecystectomy. IMPRESSION: Mild amount of stool and gas throughout the colon. Ct Abd Pelv Wo Cont    Result Date: 6/1/2017  INDICATION: abdominal distension, constipation, ? diverticulitis vs bowel obstruction COMPARISON: 5/28/2017 TECHNIQUE: Thin axial images were obtained through the abdomen and pelvis. Coronal and sagittal reconstructions were generated. Oral contrast was not administered. CT dose reduction was achieved through use of a standardized protocol tailored for this examination and automatic exposure control for dose modulation. The absence of intravenous contrast material reduces the sensitivity for evaluation of the solid parenchymal organs of the abdomen. FINDINGS: LUNG BASES: There are trace bilateral pleural effusions. INCIDENTALLY IMAGED HEART AND MEDIASTINUM: The heart is moderately enlarged. A pacemaker is incompletely seen. LIVER: There is trace perihepatic ascites. The liver otherwise appears unremarkable. GALLBLADDER: Status post cholecystectomy. SPLEEN: No mass. PANCREAS: No mass or ductal dilatation. ADRENALS: Unremarkable. KIDNEYS/URETERS: There is a 2.4 cm indeterminate lesion in the lateral left kidney. A 9 mm cyst is seen above this. The kidneys show renal cortical thinning, but are otherwise unremarkable. No hydronephrosis. No nephrolithiasis. STOMACH: The stomach is decompressed. SMALL BOWEL: No dilatation or wall thickening. COLON: Multiple diverticula are present. No evidence of diverticulitis. No dilatation or wall thickening. APPENDIX: Unremarkable. PERITONEUM: There is mild ascites in the pelvis. RETROPERITONEUM: No lymphadenopathy or aortic aneurysm. REPRODUCTIVE ORGANS: The uterus is surgically absent. URINARY BLADDER: Decompressed. BONES: The bones are osteopenic. No evidence of a destructive osseous lesion.  ADDITIONAL COMMENTS: N/A     IMPRESSION: 1. Unchanged moderate cardiomegaly with trace bilateral pleural effusions. 2. Unchanged mild perihepatic ascites. Unchanged mild ascites in the deep pelvis. 3. Unchanged 2.4 cm lesion in the lateral left kidney suspicious for renal cell carcinoma. 4. Diverticulosis coli. 5. Status post cholecystectomy and hysterectomy. Abdominal Pain: Care Instructions  Your Care Instructions    Abdominal pain has many possible causes. Some aren't serious and get better on their own in a few days. Others need more testing and treatment. If your pain continues or gets worse, you need to be rechecked and may need more tests to find out what is wrong. You may need surgery to correct the problem. Don't ignore new symptoms, such as fever, nausea and vomiting, urination problems, pain that gets worse, and dizziness. These may be signs of a more serious problem. Your doctor may have recommended a follow-up visit in the next 8 to 12 hours. If you are not getting better, you may need more tests or treatment. The doctor has checked you carefully, but problems can develop later. If you notice any problems or new symptoms, get medical treatment right away. Follow-up care is a key part of your treatment and safety. Be sure to make and go to all appointments, and call your doctor if you are having problems. It's also a good idea to know your test results and keep a list of the medicines you take. How can you care for yourself at home? · Rest until you feel better. · To prevent dehydration, drink plenty of fluids, enough so that your urine is light yellow or clear like water. Choose water and other caffeine-free clear liquids until you feel better. If you have kidney, heart, or liver disease and have to limit fluids, talk with your doctor before you increase the amount of fluids you drink. · If your stomach is upset, eat mild foods, such as rice, dry toast or crackers, bananas, and applesauce.  Try eating several small meals instead of two or three large ones. · Wait until 48 hours after all symptoms have gone away before you have spicy foods, alcohol, and drinks that contain caffeine. · Do not eat foods that are high in fat. · Avoid anti-inflammatory medicines such as aspirin, ibuprofen (Advil, Motrin), and naproxen (Aleve). These can cause stomach upset. Talk to your doctor if you take daily aspirin for another health problem. When should you call for help? Call 911 anytime you think you may need emergency care. For example, call if:  · You passed out (lost consciousness). · You pass maroon or very bloody stools. · You vomit blood or what looks like coffee grounds. · You have new, severe belly pain. Call your doctor now or seek immediate medical care if:  · Your pain gets worse, especially if it becomes focused in one area of your belly. · You have a new or higher fever. · Your stools are black and look like tar, or they have streaks of blood. · You have unexpected vaginal bleeding. · You have symptoms of a urinary tract infection. These may include:  ¨ Pain when you urinate. ¨ Urinating more often than usual.  ¨ Blood in your urine. · You are dizzy or lightheaded, or you feel like you may faint. Watch closely for changes in your health, and be sure to contact your doctor if:  · You are not getting better after 1 day (24 hours). Where can you learn more? Go to http://kathi-samira.info/. Enter Q823 in the search box to learn more about \"Abdominal Pain: Care Instructions. \"  Current as of: May 27, 2016  Content Version: 11.2  © 3342-4500 [employer]. Care instructions adapted under license by [employer] (which disclaims liability or warranty for this information). If you have questions about a medical condition or this instruction, always ask your healthcare professional. [address] any warranty or liability for your use of this information. Constipation: Care Instructions  Your Care Instructions  Constipation means that you have a hard time passing stools (bowel movements). People pass stools from 3 times a day to once every 3 days. What is normal for you may be different. Constipation may occur with pain in the rectum and cramping. The pain may get worse when you try to pass stools. Sometimes there are small amounts of bright red blood on toilet paper or the surface of stools. This is because of enlarged veins near the rectum (hemorrhoids). A few changes in your diet and lifestyle may help you avoid ongoing constipation. Your doctor may also prescribe medicine to help loosen your stool. Some medicines can cause constipation. These include pain medicines and antidepressants. Tell your doctor about all the medicines you take. Your doctor may want to make a medicine change to ease your symptoms. Follow-up care is a key part of your treatment and safety. Be sure to make and go to all appointments, and call your doctor if you are having problems. It's also a good idea to know your test results and keep a list of the medicines you take. How can you care for yourself at home? · Drink plenty of fluids, enough so that your urine is light yellow or clear like water. If you have kidney, heart, or liver disease and have to limit fluids, talk with your doctor before you increase the amount of fluids you drink. · Include high-fiber foods in your diet each day. These include fruits, vegetables, beans, and whole grains. · Get at least 30 minutes of exercise on most days of the week. Walking is a good choice. You also may want to do other activities, such as running, swimming, cycling, or playing tennis or team sports. · Take a fiber supplement, such as Citrucel or Metamucil, every day. Read and follow all instructions on the label. · Schedule time each day for a bowel movement. A daily routine may help. Take your time having your bowel movement.   · Support your feet with a small step stool when you sit on the toilet. This helps flex your hips and places your pelvis in a squatting position. · Your doctor may recommend an over-the-counter laxative to relieve your constipation. Examples are Milk of Magnesia and MiraLax. Read and follow all instructions on the label. Do not use laxatives on a long-term basis. When should you call for help? Call your doctor now or seek immediate medical care if:  · You have new or worse belly pain. · You have new or worse nausea or vomiting. · You have blood in your stools. Watch closely for changes in your health, and be sure to contact your doctor if:  · Your constipation is getting worse. · You do not get better as expected. Where can you learn more? Go to http://kathi-samira.info/. Enter [id number] in the search box to learn more about \"Constipation: Care Instructions. \"  Current as of: May 27, 2016  Content Version: 11.2  © 2693-9750 [employer]. Care instructions adapted under license by [employer] (which disclaims liability or warranty for this information). If you have questions about a medical condition or this instruction, always ask your healthcare professional. [place] [address] any warranty or liability for your use of this information.

## 2017-06-01 NOTE — ED PROVIDER NOTES
HPI Comments: This is a 81 yo  female with medical hx remarkable for anxiety, bradycardia, mitral valve regurgitation, CHF and hypertension presenting with complaint of worsening abdominal pain in past 24 hrs, described as pressure in the upper abdomen associated with urge to have a BM and only passing \" brown colored liquid stool\". She reports using glycerin suppositories, smooth move tea and two capfuls of milk of magnesia with only production of liquid stool. Appetite has been decreased. Pt seen in this ED 7 days ago for constipation and had BM in ED with minimal solid stool output since. Passing flatus. No fever, chills, headache, chest pain, SOB, or urinary changes. Patient is a 80 y.o. female presenting with abdominal pain. The history is provided by the patient. Abdominal Pain    Associated symptoms include constipation (having liquid stool). Pertinent negatives include no fever, no diarrhea, no nausea, no vomiting, no frequency, no headaches, no arthralgias, no chest pain and no back pain. Past Medical History:   Diagnosis Date    Anxiety     Bradycardia     Congestive heart failure (HCC)     Constipated     High blood pressure     Mitral valve regurgitation     Repaired 7/30/09    Psychiatric disorder     Anxiety       Past Surgical History:   Procedure Laterality Date    HX APPENDECTOMY      HX BACK SURGERY      HX BREAST LUMPECTOMY      HX CHOLECYSTECTOMY      HX HYSTERECTOMY      HX PARTIAL THYROIDECTOMY      left side    HX SHOULDER ARTHROSCOPY      Whiplash/muscle surgery    HX TUBAL LIGATION           History reviewed. No pertinent family history. Social History     Social History    Marital status:      Spouse name: N/A    Number of children: N/A    Years of education: N/A     Occupational History    Not on file. Social History Main Topics    Smoking status: Never Smoker    Smokeless tobacco: Not on file    Alcohol use No    Drug use:  No  Sexual activity: Not on file     Other Topics Concern    Not on file     Social History Narrative         ALLERGIES: Coreg [carvedilol]; Phenylbutazone; Procainamide; Quinaglute dura-tabs; Ace inhibitors; Actonel [risedronate]; Aspirin; Atacand [candesartan]; Avelox [moxifloxacin]; Bromplex dm [brompheniramine-pseudoeph-dm]; Calcium channel blocking agents-dihydropyridines; Celexa [citalopram]; Codeine; Corgard [nadolol]; Coumadin [warfarin]; Doxepin; Effexor [venlafaxine]; Hydralazine; Hydroxyzine; Lopressor [metoprolol tartrate]; Macrodantin [nitrofurantoin macrocrystalline]; Motrin [ibuprofen]; Pacerone [amiodarone]; Palgic [carbinoxamine maleate]; Paxil [paroxetine hcl]; Pcn [penicillins]; Phenergan [promethazine]; Prednisone; Propulsid; Serax [oxazepam]; Statins-hmg-coa reductase inhibitors; Sulfa (sulfonamide antibiotics); Tessalon [benzonatate]; Tiazac [diltiazem hcl]; Trazodone; Ultram [tramadol]; Verapamil; Matthieu Maudlin; Zelnorm [tegaserod hydrogen maleate]; and Zetia [ezetimibe]    Review of Systems   Constitutional: Negative. Negative for chills, fatigue and fever. HENT: Negative. Negative for congestion, ear pain, facial swelling, rhinorrhea, sneezing and sore throat. Eyes: Negative for pain, discharge and itching. Respiratory: Negative for cough, chest tightness and shortness of breath. Cardiovascular: Negative. Negative for chest pain and leg swelling. Gastrointestinal: Positive for abdominal distention, abdominal pain and constipation (having liquid stool). Negative for diarrhea, nausea and vomiting. Genitourinary: Negative for difficulty urinating, frequency and urgency. Musculoskeletal: Negative for arthralgias, back pain, joint swelling, neck pain and neck stiffness. Skin: Negative for color change and rash. Neurological: Negative for dizziness, numbness and headaches. Psychiatric/Behavioral: Negative for confusion and decreased concentration.    All other systems reviewed and are negative. Vitals:    06/01/17 0005 06/01/17 0045 06/01/17 0224   BP: (!) 143/94 149/79 144/78   Pulse: 84 78 76   Resp: 18 18 18   Temp: 98.7 °F (37.1 °C)  98.6 °F (37 °C)   SpO2: 99% 97% 99%            Physical Exam   Constitutional: She is oriented to person, place, and time. She appears well-developed and well-nourished. No distress.  female elderly in NAD   HENT:   Head: Normocephalic and atraumatic. Right Ear: External ear normal.   Left Ear: External ear normal.   Nose: Nose normal.   Mouth/Throat: Oropharynx is clear and moist. No oropharyngeal exudate. Eyes: Conjunctivae and EOM are normal. Pupils are equal, round, and reactive to light. Right eye exhibits no discharge. Left eye exhibits no discharge. No scleral icterus. Neck: Normal range of motion. Neck supple. Cardiovascular: Normal rate and regular rhythm. Exam reveals no gallop and no friction rub. Murmur heard. Pulmonary/Chest: Effort normal and breath sounds normal. She has no wheezes. She has no rales. Abdominal: Soft. Bowel sounds are normal. She exhibits no distension. There is tenderness (Epigastric). There is no rebound and no guarding. Neurological: She is alert and oriented to person, place, and time. Skin: Skin is warm and dry. She is not diaphoretic. Psychiatric: She has a normal mood and affect. Her behavior is normal.   Nursing note and vitals reviewed. MDM  Number of Diagnoses or Management Options  Abdominal pain, generalized:   Constipation, unspecified constipation type:   Diagnosis management comments: 79 yo  female with complaint of upper abdominal pain, distension and concern for constipation. Pt afebrile with soft abdomen and active bowel sounds throughout on exam.    Plan  CBC, CMP, lipase, UA, XRay KUB and reassess.  Micaela RIVERO Ир[place]Potre[place], [place]         Amount and/or Complexity of Data Reviewed  Clinical lab tests: ordered and reviewed  Tests in the radiology section of CPT®: ordered and reviewed  Independent visualization of images, tracings, or specimens: yes      ED Course       Procedures      Progress note    Mild stool on KUB. Liquid BM x two while in ED. Micaela Leigh [place]    CT abd reviewed and no sig change from prior. Increased flatus on xray abd. Micaela Leigh [place]    Patient's results have been reviewed with them. Patient and/or family have verbally conveyed their understanding and agreement of the patient's signs, symptoms, diagnosis, treatment and prognosis and additionally agree to follow up as recommended or return to the Emergency Room should their condition change prior to follow-up. Discharge instructions have also been provided to the patient with some educational information regarding their diagnosis as well a list of reasons why they would want to return to the ER prior to their follow-up appointment should their condition change. Aditya Eganma    A/P  Abdominal pain: Probiotic daily, gas x, daily bowel regimen. Return for any new or worsening.  Micaela [facility]

## 2017-06-01 NOTE — CALL BACK NOTE
[facility] Emergency Department Follow Up Call Record    Discharged to : Home/Family Home/Home Health/Skilled Facility/Rehab/Assisted Living/Other_home ______  1) Did you receive your discharge instructions? Yes        2) Do you understand them? Yes         3) Are you able to follow them? Yes Mrs. Jennifer Julio reports she may have a ride to see her Cardiologist tomorrow. Apt. At 10am.                  She states feeling better this am. Noted that her daughter was with her in the ED this visit. If NO, what can I clarify for you? 4) Do you understand your diagnosis? Yes         5) Do you know which symptoms should prompt you to call the doctor? Yes     6) Were you able to fill and  any medications that were prescribed? Yes     7) You were prescribed _no medications this ED visit_________for __abdominal pain__________________. Common side effects of this medication are____n/a________________. This is not a complete list so please review the forms given from the pharmacy for a complete list.      8) Are there any questions about your medications? No            Have you scheduled any recommended doctors appointments (specialty, PCP) No PCP appointment. \"I saw him last week\". If NO, what barriers are you encountering (transportation/lost contact info/cost/  Tranportation, expense of cab.   didnt think necessary/no PCP  9) If discharged with Home Health, has the agency contacted you to schedule visit? Not applicable  10) Is there anyone available to help you at home (meals, errands, transportation    monitoring) (adult children, neighbors, private duty companions) Yes, but not frequently. Has neighbors and friends sometime assist.     6) Are you on a special diet? No         If YES, do you understand the requirements for this diet? Education provided? 12) If presented with cough, bronchitis, COPD, asthma, is it ok to ask that the   respiratory disease management educator call you?  Not applicable      13)  A) If presented with fall, were you issued an assistive device in the ED    Are you using? Not applicable  B) If given RX for device, have you obtained? Not applicable       If NO, barriers? C) Therapist recommended: NO   Are you able to implement the suggestions? Not applicable        If NO, barriers to implementation? D) Are you having any difficulties with mobility inside your home?     (steps, bed, tub)No   If YES, ask if the [facility] PT can contact patient and good time and number?  14)  At the end of your discharge instructions, there is information about accessing [facility] SERVICES, have you had a chance to review those? No         Do you have any questions about signing up for this service? We encourage our patients to be active participants in their healthcare and this site is one of the ways to do that. It will allow you to access parts of your medical record, email your doctors office, schedule appointments, and request medications refills . 15) Are there any other questions that I can answer for you regarding    your Emergency department visit?  NO             Estimated Call Time:______11:01 AM  _____________ Date/Time:_______________

## 2017-06-01 NOTE — ED NOTES
Triage: Patient arrives via EMS for c/o diffuse abdominal pain since last Saturday when she was seen in ED. Had x2 enemas with successful results and patient has been giving herself enemas at home causing diarrhea. Patient reports she has not been able to get her bowel on a regimen since leaving here.  Denies n/v.